# Patient Record
Sex: FEMALE | Race: WHITE
[De-identification: names, ages, dates, MRNs, and addresses within clinical notes are randomized per-mention and may not be internally consistent; named-entity substitution may affect disease eponyms.]

---

## 2019-06-23 ENCOUNTER — HOSPITAL ENCOUNTER (EMERGENCY)
Dept: HOSPITAL 7 - FB.ED | Age: 59
Discharge: HOME | End: 2019-06-23
Payer: MEDICAID

## 2019-06-23 DIAGNOSIS — F41.9: ICD-10-CM

## 2019-06-23 DIAGNOSIS — E78.00: ICD-10-CM

## 2019-06-23 DIAGNOSIS — I10: ICD-10-CM

## 2019-06-23 DIAGNOSIS — Z88.2: ICD-10-CM

## 2019-06-23 DIAGNOSIS — Z88.8: ICD-10-CM

## 2019-06-23 DIAGNOSIS — F32.9: ICD-10-CM

## 2019-06-23 DIAGNOSIS — K59.00: Primary | ICD-10-CM

## 2019-06-23 DIAGNOSIS — E11.9: ICD-10-CM

## 2019-06-23 NOTE — EDM.PDOC
ED HPI GENERAL MEDICAL PROBLEM





- General


Chief Complaint: Gastrointestinal Problem


Stated Complaint: CONSTIPATION


Time Seen by Provider: 06/23/19 19:05


Source of Information: Reports: Patient


History Limitations: Reports: No Limitations





- History of Present Illness


INITIAL COMMENTS - FREE TEXT/NARRATIVE: 





59-year-old female who reports history of chronic constipation and reports that 

she had a hard stool this morning and since that time has been unable to pass 

any further stool, yet feels the need to have a bowel movement and has been 

having intermittent sharp and cramping perianal when she tries to have a bowel 

movement. She reports the pain is a 10/10 at this point and she is actually 

quite diaphoretic when she is to have a bowel movement secondary to the pain 

and straining. She has no abdominal pain associated with this. She's had no 

nausea or vomiting. She has been able to eat and drink normally. She is having 

no problems with urination. She reports that she has taken her Senokot, milk of 

magnesia and a dose of MiraLAX today without relief. There are no other 

associated signs or symptoms. There are no other modifying factors.


Onset: Today


Duration: Getting Worse, Intermittent


Location: Reports: Other (Perianal area)


Quality: Reports: Sharp, Other (Cramping)


Severity: Moderate (to severe)


Improves with: Reports: None


Worsens with: Reports: Other (When attempting to have bowel movement)


Context: Reports: Other (As above)


Associated Symptoms: Reports: Diaphoresis


Treatments PTA: Reports: Other (see below) (As above)


  ** abd pain and rectal


Pain Score (Numeric/FACES): 10





- Related Data


 Allergies











Allergy/AdvReac Type Severity Reaction Status Date / Time


 


celecoxib [From Celebrex] Allergy  Cannot Verified 06/23/19 19:23





   Remember  


 


Penicillins Allergy  Cannot Verified 06/23/19 19:23





   Remember  


 


Sulfa (Sulfonamide Allergy  Cannot Verified 06/23/19 19:23





Antibiotics)   Remember  














Past Medical History


Cardiovascular History: Reports: High Cholesterol, Hypertension


Gastrointestinal History: Reports: Chronic Constipation


Psychiatric History: Reports: Anxiety, Depression


Endocrine/Metabolic History: Reports: Diabetes, Type II





- Past Surgical History


Female  Surgical History: Reports: Hysterectomy


Neurological Surgical History: Reports: Lumbar Spine


Musculoskeletal Surgical History: Reports: Knee Replacement (Left total knee 

replacement with multiple knee surgeries prior to this.), Other (See Below) (

Right foot surgery)





Social & Family History





- Family History


Family Medical History: Noncontributory





- Tobacco Use


Smoking Status *Q: Never Smoker





- Alcohol Use


Alcohol Use History: No





ED ROS GENERAL





- Review of Systems


Review Of Systems: See Below


Constitutional: Reports: Diaphoresis


HEENT: Reports: No Symptoms


Respiratory: Reports: No Symptoms


Cardiovascular: Reports: No Symptoms


GI/Abdominal: Reports: Constipation, Other (Perianal pain with hard stools)


: Reports: No Symptoms


Musculoskeletal: Reports: No Symptoms


Skin: Reports: Diaphoresis


Neurological: Reports: No Symptoms


Hematologic/Lymphatic: Reports: No Symptoms


Immunologic: Reports: No Symptoms





ED EXAM, GENERAL





- Physical Exam


Exam: See Below


Exam Limited By: No Limitations


General Appearance: Alert, WD/WN, No Apparent Distress


Eye Exam: Bilateral Eye: EOMI, Normal Inspection, PERRL


Ears: Normal External Exam


Ear Exam: Bilateral Ear: Auricle Normal


Nose: Normal Inspection, Normal Mucosa, No Blood


Throat/Mouth: Normal Inspection, Normal Oropharynx, Normal Voice, No Airway 

Compromise


Head: Atraumatic, Normocephalic


Neck: Normal Inspection, Supple, Non-Tender, Full Range of Motion


Respiratory/Chest: No Respiratory Distress, Lungs Clear, Normal Breath Sounds, 

No Accessory Muscle Use, Chest Non-Tender


Cardiovascular: Normal Peripheral Pulses, Regular Rate, Rhythm, No JVD


Peripheral Pulses: 2+: Radial (L), Radial (R)


GI/Abdominal: Normal Bowel Sounds, Soft, Non-Tender, No Organomegaly, No Mass


Back Exam: Normal Inspection


Extremities: Normal Inspection (Brace on left lower extremity), Normal Range of 

Motion, Non-Tender, Normal Capillary Refill


Neurological: Alert, Oriented, CN II-XII Intact, No Motor/Sensory Deficits


Skin Exam: Warm, Intact, Normal Color, No Rash, Diaphoretic





Course





- Vital Signs


Last Recorded V/S: 


 Last Vital Signs











Temp  36.5 C   06/23/19 18:30


 


Pulse  94   06/23/19 18:30


 


Resp  18   06/23/19 18:30


 


BP  123/70   06/23/19 18:30


 


Pulse Ox  99   06/23/19 18:30














- Orders/Labs/Meds


Orders: 


 Active Orders 24 hr











 Category Date Time Status


 


 Enema [RC] ASDIRECTED Care  06/23/19 19:23 Active














- Re-Assessments/Exams


Free Text/Narrative Re-Assessment/Exam: 





06/23/19 20:11: Patient given soapsuds enema and tolerated well. She had urge 

results at bowel movement and felt markedly improved following this. This 

seemed to completely relieve her symptoms. She is ready for discharge at this 

point.








Departure





- Departure


Time of Disposition: 20:15


Disposition: Home, Self-Care 01


Condition: Good (Improved)


Clinical Impression: 


 Constipation, Fecal impaction in rectum








- Discharge Information


Instructions:  Constipation, Adult, Easy-to-Read


Referrals: 


Matt Blue MD [Primary Care Provider] - 


Forms:  ED Department Discharge


Additional Instructions: 


You appear to have a fecal impaction which was cleared with the enema that you 

were given in the emergency department. In the future, if you begin to feel 

constipated, you should take MiraLAX one dose up to 3 times a day until you 

have good results a bowel movement and that he may go back to your regular 

regimen for constipation. Increase your fluid intake. Follow-up with your 

primary doctor as needed. Back to the emergency department for vomiting, 

abdominal pain or any other concerning sign or symptom.





- My Orders


Last 24 Hours: 


My Active Orders





06/23/19 19:23


Enema [RC] ASDIRECTED 














- Assessment/Plan


Last 24 Hours: 


My Active Orders





06/23/19 19:23


Enema [RC] ASDIRECTED